# Patient Record
Sex: MALE | Race: WHITE | NOT HISPANIC OR LATINO | Employment: UNEMPLOYED | URBAN - METROPOLITAN AREA
[De-identification: names, ages, dates, MRNs, and addresses within clinical notes are randomized per-mention and may not be internally consistent; named-entity substitution may affect disease eponyms.]

---

## 2018-06-17 ENCOUNTER — HOSPITAL ENCOUNTER (EMERGENCY)
Facility: HOSPITAL | Age: 53
Discharge: HOME | End: 2018-06-17
Admitting: EMERGENCY MEDICINE
Payer: COMMERCIAL

## 2018-06-17 ENCOUNTER — APPOINTMENT (EMERGENCY)
Dept: RADIOLOGY | Facility: HOSPITAL | Age: 53
End: 2018-06-17
Payer: COMMERCIAL

## 2018-06-17 VITALS
TEMPERATURE: 98.5 F | RESPIRATION RATE: 16 BRPM | OXYGEN SATURATION: 99 % | HEART RATE: 79 BPM | DIASTOLIC BLOOD PRESSURE: 97 MMHG | SYSTOLIC BLOOD PRESSURE: 184 MMHG

## 2018-06-17 DIAGNOSIS — M79.631 RIGHT FOREARM PAIN: Primary | ICD-10-CM

## 2018-06-17 PROCEDURE — 73090 X-RAY EXAM OF FOREARM: CPT

## 2018-06-17 PROCEDURE — 99283 EMERGENCY DEPT VISIT LOW MDM: CPT

## 2018-06-17 RX ORDER — NAPROXEN 500 MG/1
500 TABLET ORAL 2 TIMES DAILY WITH MEALS
Qty: 10 TABLET | Refills: 0 | Status: SHIPPED | OUTPATIENT
Start: 2018-06-17 | End: 2018-06-22

## 2018-06-17 NOTE — ED PROVIDER NOTES
History  Chief Complaint   Patient presents with    Arm Pain     pt reports waking up 3 days ago and right arm "feels like it's broken " pt states "I feel a weird growth on the bone " reports pain in forearm  denies injury  60-year-old male history of hypertension, presents for evaluation of right forearm pain for the past 3 days  Patient reports that he has pinpoint tenderness over the ulnar aspect and states that he feels a bony growth over the bone where he has pain  Patient reports pain with rotation of the wrist   Patient states he feels as if it is broken however denies any injury or trauma to the area  States that he woke up with this pain  Reports that he has not been taking anything for pain nor applying ice  Denies paresthesias, radiation of pain into the hands  He denies fever, numbness, weakness  Pt is right hand dominant  Prior to Admission Medications   Prescriptions Last Dose Informant Patient Reported? Taking? DiphenhydrAMINE HCl (BENADRYL ALLERGY PO)   Yes No   Sig: Take by mouth as needed  cyclobenzaprine (FLEXERIL) 10 mg tablet   No No   Sig: Take 1 tablet by mouth 3 (three) times a day as needed for muscle spasms for up to 10 days   metoprolol tartrate (LOPRESSOR) 50 mg tablet   Yes No   Sig: Take 50 mg by mouth 2 (two) times a day  naproxen (NAPROSYN) 500 mg tablet   No No   Sig: Take 1 tablet by mouth 2 (two) times a day as needed for mild pain   oxymetazoline (AFRIN) 0 05 % nasal spray   Yes No   Si sprays into each nostril as needed for congestion  Facility-Administered Medications: None       Past Medical History:   Diagnosis Date    Hepatitis C     Hypertension     Psychiatric disorder        Past Surgical History:   Procedure Laterality Date    ABDOMINAL SURGERY      tumor removed from abdomen     HERNIA REPAIR         History reviewed  No pertinent family history  I have reviewed and agree with the history as documented      Social History Substance Use Topics    Smoking status: Former Smoker    Smokeless tobacco: Not on file    Alcohol use No        Review of Systems   Constitutional: Negative for chills and fever  Cardiovascular: Negative for chest pain  Gastrointestinal: Negative for vomiting  Musculoskeletal: Positive for myalgias  Negative for arthralgias and joint swelling  Skin: Negative for color change, rash and wound  Physical Exam  Physical Exam   Constitutional: He is oriented to person, place, and time  He appears well-developed and well-nourished  No distress  Musculoskeletal: He exhibits tenderness  He exhibits no edema or deformity  Right forearm: He exhibits tenderness and bony tenderness  He exhibits no swelling, no edema, no deformity and no laceration  Arms:  Pain over forearm with supination and pronation of right wrist  No swelling, deformity, rash or bony growth noted with palpation  R pulse 2 +, NVI   Neurological: He is alert and oriented to person, place, and time  Skin: Skin is warm  Capillary refill takes less than 2 seconds  No rash noted  He is not diaphoretic  No erythema  No pallor  Vitals reviewed        Vital Signs  ED Triage Vitals   Temperature Pulse Respirations Blood Pressure SpO2   06/17/18 1857 06/17/18 1857 06/17/18 1857 06/17/18 1858 06/17/18 1857   98 5 °F (36 9 °C) 79 16 (!) 192/115 99 %      Temp Source Heart Rate Source Patient Position - Orthostatic VS BP Location FiO2 (%)   06/17/18 1857 06/17/18 1857 06/17/18 1857 06/17/18 1857 --   Temporal Monitor Sitting Right arm       Pain Score       --                  Vitals:    06/17/18 1857 06/17/18 1858 06/17/18 1902   BP:  (!) 192/115 (!) 184/97   Pulse: 79     Patient Position - Orthostatic VS: Sitting         Visual Acuity      ED Medications  Medications - No data to display    Diagnostic Studies  Results Reviewed     None                 XR forearm 2 views RIGHT   ED Interpretation by Peter Longo PA-C (06/17 1942) No fracture or bony growth noted  Procedures  Procedures       Phone Contacts  ED Phone Contact    ED Course                               MDM  CritCare Time    Disposition  Final diagnoses:   Right forearm pain     Time reflects when diagnosis was documented in both MDM as applicable and the Disposition within this note     Time User Action Codes Description Comment    6/17/2018  7:45 PM Yara Reyes Add [Q88 252] Right forearm pain       ED Disposition     ED Disposition Condition Comment    Discharge  Ruslan Sunshine discharge to home/self care  Condition at discharge: Stable        Follow-up Information     Follow up With Specialties Details Why [address] [facility] Schedule an appointment as soon as possible for a visit in 2 weeks Follow up if symptoms persist   [facility] [id number] [phone]          Patient's Medications   Discharge Prescriptions    NAPROXEN (NAPROSYN) 500 MG TABLET    Take 1 tablet (500 mg total) by mouth 2 (two) times a day with meals for 5 days       Start Date: 6/17/2018 End Date: 6/22/2018       Order Dose: 500 mg       Quantity: 10 tablet    Refills: 0     No discharge procedures on file      ED Provider  Electronically Signed by           Brenna Cavanaugh PA-C  06/17/18 7199

## 2018-06-17 NOTE — DISCHARGE INSTRUCTIONS
Musculoskeletal Pain   WHAT YOU NEED TO KNOW:   Muscle pain can be dull, achy, or sharp  You may have pain and tenderness to the touch as well  It can occur anywhere on your body and is often brought on by exercise  Muscle pain may occur from an injury, such as a sprain, tendonitis, or bone fracture  Muscle pain can also be the result of medical conditions, such as polymyositis, fibromyalgia, and connective tissue disorders  DISCHARGE INSTRUCTIONS:   Self care:   · Rest  as directed and avoid activity that causes pain  You may be able to return to normal activity when you can move without pain  Follow directions for rest and activity  You are at risk for injury for 3 weeks after your symptoms go away  · Ice  your painful muscle area to decrease pain and swelling  Use an ice pack, or put ice in a plastic bag and cover it with a towel  Always  put a cloth between the ice and your skin  Apply the ice as often as directed for the first 24 to 48 hours  · Compression  with a splint, brace, or elastic bandage helps decrease pain and swelling  This may be needed for muscle pain in arms or legs  A splint, brace, or bandage will also help protect the painful area when you move around  · Elevate  a painful arm or leg to reduce swelling and pain  Elevate your limb while you are sitting or lying down  Prop a painful leg on pillows to keep it above the level of your heart  Medicines:   · NSAIDs  help decrease swelling and pain or fever  This medicine is available with or without a doctor's order  NSAIDs can cause stomach bleeding or kidney problems in certain people  If you take blood thinner medicine, always ask your healthcare provider if NSAIDs are safe for you  Always read the medicine label and follow directions  · Acetaminophen  is used to decrease pain  It is available without a doctor's order  Ask your healthcare provider how much to take and when to take it  Follow directions   Acetaminophen can cause liver damage if not taken correctly  Do not take more than one medicine that contains acetaminophen unless directed  · Muscle relaxers  help relax your muscles to decrease pain and muscle spasms  · Steroids  may be given to decrease redness, pain, and swelling  · Take your medicine as directed  Contact your healthcare provider if you think your medicine is not helping or if you have side effects  Tell him if you are allergic to any medicine  Keep a list of the medicines, vitamins, and herbs you take  Include the amounts, and when and why you take them  Bring the list or the pill bottles to follow-up visits  Carry your medicine list with you in case of an emergency  Follow up with your healthcare provider as directed: You may need more tests to help healthcare providers find the cause of your muscle pain  You may need physical therapy to learn muscle strengthening exercises  Write down your questions so you remember to ask them during your visits  Contact your healthcare provider if:   · You have a fever  · You have trouble sleeping because of your pain  · Your painful area becomes more tender, red, and warm to the touch  · You have decreased movement of the painful area  · You have questions or concerns about your condition or care  Return to the emergency department if:   · You have increased severe pain when you move the painful muscle area  · You lose feeling in your painful muscle area  · You have new or worse swelling in the painful area  Your skin may feel tight  · You have increased muscle pain or pain that does not improve with treatment  © 2017 [employer] [employer] Information is for End User's use only and may not be sold, redistributed or otherwise used for commercial purposes  All illustrations and images included in CareNotes® are the copyrighted property of [email or web address]  or Gaurav Dudley    The above information is an  only  It is not intended as medical advice for individual conditions or treatments  Talk to your doctor, nurse or pharmacist before following any medical regimen to see if it is safe and effective for you

## 2018-10-07 ENCOUNTER — HOSPITAL ENCOUNTER (EMERGENCY)
Facility: HOSPITAL | Age: 53
Discharge: HOME | End: 2018-10-07
Attending: EMERGENCY MEDICINE | Admitting: EMERGENCY MEDICINE
Payer: COMMERCIAL

## 2018-10-07 ENCOUNTER — APPOINTMENT (EMERGENCY)
Dept: RADIOLOGY | Facility: HOSPITAL | Age: 53
End: 2018-10-07
Payer: COMMERCIAL

## 2018-10-07 VITALS
WEIGHT: 180 LBS | SYSTOLIC BLOOD PRESSURE: 166 MMHG | RESPIRATION RATE: 18 BRPM | OXYGEN SATURATION: 98 % | HEART RATE: 72 BPM | DIASTOLIC BLOOD PRESSURE: 90 MMHG | TEMPERATURE: 98.6 F

## 2018-10-07 DIAGNOSIS — M53.3 COCCYX PAIN: Primary | ICD-10-CM

## 2018-10-07 PROCEDURE — 99283 EMERGENCY DEPT VISIT LOW MDM: CPT

## 2018-10-07 PROCEDURE — 96372 THER/PROPH/DIAG INJ SC/IM: CPT

## 2018-10-07 PROCEDURE — 72100 X-RAY EXAM L-S SPINE 2/3 VWS: CPT

## 2018-10-07 RX ORDER — METHOCARBAMOL 500 MG/1
500 TABLET, FILM COATED ORAL 2 TIMES DAILY
Qty: 10 TABLET | Refills: 0 | Status: SHIPPED | OUTPATIENT
Start: 2018-10-07 | End: 2018-10-12

## 2018-10-07 RX ORDER — LIDOCAINE 50 MG/G
1 PATCH TOPICAL DAILY
Qty: 30 PATCH | Refills: 0 | Status: SHIPPED | OUTPATIENT
Start: 2018-10-07

## 2018-10-07 RX ORDER — LIDOCAINE 50 MG/G
1 PATCH TOPICAL ONCE
Status: DISCONTINUED | OUTPATIENT
Start: 2018-10-07 | End: 2018-10-07 | Stop reason: HOSPADM

## 2018-10-07 RX ORDER — KETOROLAC TROMETHAMINE 30 MG/ML
15 INJECTION, SOLUTION INTRAMUSCULAR; INTRAVENOUS ONCE
Status: COMPLETED | OUTPATIENT
Start: 2018-10-07 | End: 2018-10-07

## 2018-10-07 RX ADMIN — KETOROLAC TROMETHAMINE 15 MG: 30 INJECTION, SOLUTION INTRAMUSCULAR at 20:23

## 2018-10-07 RX ADMIN — LIDOCAINE 1 PATCH: 50 PATCH TOPICAL at 20:24

## 2018-10-07 NOTE — ED PROVIDER NOTES
History  Chief Complaint   Patient presents with    Tailbone Pain     Pt c/o tailbone pain reports pain started today, denies any injury     Patient is a 48year-old male that presents to emergency department with intermittent sharp tailbone pain with radiation to left ankle for 1 week  Patient states that he has been feeling persistent dull and achy tailbone pain for 3 months  Patient is an avid walker and walks 7-8 miles per day, including today  Patient states that sometimes his tailbone pain causes his left knee to "give out and buckle underneath him"  Patient also states that his tailbone "drops down and he has to reduce it "  Patient affirms provocative factors of pressure to tailbone  Patient denies palliative factors  Patient states that Aleve is an ineffective treatment "that I can take a whole bottle and would not even touch by pain"  Patient denies fall and trauma  Patient denies saddle paresthesias and urinary symptoms  Patient denies diarrhea, and constipation  Patient denies fevers, chills, nausea, and vomiting  No chest pain, shortness of breath, or abdominal pain  Patient is not in acute distress  History provided by:  Patient   used: No        Prior to Admission Medications   Prescriptions Last Dose Informant Patient Reported? Taking? DiphenhydrAMINE HCl (BENADRYL ALLERGY PO)   Yes No   Sig: Take by mouth as needed  cyclobenzaprine (FLEXERIL) 10 mg tablet   No No   Sig: Take 1 tablet by mouth 3 (three) times a day as needed for muscle spasms for up to 10 days   metoprolol tartrate (LOPRESSOR) 50 mg tablet   Yes No   Sig: Take 50 mg by mouth 2 (two) times a day     naproxen (NAPROSYN) 500 mg tablet   No No   Sig: Take 1 tablet by mouth 2 (two) times a day as needed for mild pain   naproxen (NAPROSYN) 500 mg tablet   No No   Sig: Take 1 tablet (500 mg total) by mouth 2 (two) times a day with meals for 5 days   oxymetazoline (AFRIN) 0 05 % nasal spray   Yes No   Si sprays into each nostril as needed for congestion  Facility-Administered Medications: None       Past Medical History:   Diagnosis Date    Hepatitis C     Hypertension     Psychiatric disorder        Past Surgical History:   Procedure Laterality Date    ABDOMINAL SURGERY      tumor removed from abdomen     HERNIA REPAIR         History reviewed  No pertinent family history  I have reviewed and agree with the history as documented  Social History   Substance Use Topics    Smoking status: Former Smoker    Smokeless tobacco: Never Used    Alcohol use No        Review of Systems   Constitutional: Negative for activity change, appetite change, chills and fever  Eyes: Negative for photophobia and visual disturbance  Respiratory: Negative for cough, chest tightness and shortness of breath  Cardiovascular: Negative for chest pain and palpitations  Gastrointestinal: Negative for abdominal pain, constipation, diarrhea, nausea and vomiting  Genitourinary: Negative for difficulty urinating, dysuria and frequency  Musculoskeletal: Negative for back pain, gait problem, neck pain and neck stiffness  Tailbone pain   Skin: Negative for pallor and rash  Allergic/Immunologic: Negative for environmental allergies and food allergies  Neurological: Negative for dizziness, weakness, light-headedness, numbness and headaches  Psychiatric/Behavioral: Negative for confusion  The patient is hyperactive  All other systems reviewed and are negative  Physical Exam  Physical Exam   Constitutional: He is oriented to person, place, and time  He appears well-developed and well-nourished  He is cooperative  Non-toxic appearance  He does not have a sickly appearance  He does not appear ill  No distress  HENT:   Head: Normocephalic and atraumatic  Right Ear: Hearing and external ear normal  No drainage, swelling or tenderness  No mastoid tenderness   No decreased hearing is noted  Left Ear: Hearing and external ear normal  No drainage, swelling or tenderness  No mastoid tenderness  No decreased hearing is noted  Nose: Nose normal    Mouth/Throat: Uvula is midline, oropharynx is clear and moist and mucous membranes are normal    Eyes: Pupils are equal, round, and reactive to light  Conjunctivae, EOM and lids are normal  Right eye exhibits no discharge  Left eye exhibits no discharge  Neck: Trachea normal, normal range of motion, full passive range of motion without pain and phonation normal  Neck supple  No JVD present  No tracheal tenderness, no spinous process tenderness and no muscular tenderness present  No neck rigidity  No tracheal deviation, no edema, no erythema and normal range of motion present  Cardiovascular: Normal rate, regular rhythm, normal heart sounds, intact distal pulses and normal pulses  Pulses:       Carotid pulses are 2+ on the right side, and 2+ on the left side  Radial pulses are 2+ on the right side, and 2+ on the left side  Dorsalis pedis pulses are 2+ on the right side, and 2+ on the left side  Posterior tibial pulses are 2+ on the right side, and 2+ on the left side  Pulmonary/Chest: Effort normal and breath sounds normal  No stridor  No respiratory distress  He has no decreased breath sounds  He has no wheezes  He has no rhonchi  He has no rales  He exhibits no tenderness and no crepitus  Abdominal: Soft  Bowel sounds are normal  He exhibits no distension  There is no tenderness  There is no guarding  Musculoskeletal: Normal range of motion  Passive ROM intact  Upper and lower extremity 5/5 bilaterally  Neurovascularly intact  No grinding or clicking of joints    No tenderness with palpation of the coccyx  Gait normal   Lymphadenopathy:        Head (right side): No submental, no submandibular, no tonsillar, no preauricular, no posterior auricular and no occipital adenopathy present          Head (left side): No submental, no submandibular, no tonsillar, no preauricular, no posterior auricular and no occipital adenopathy present  He has no cervical adenopathy  Right cervical: No superficial cervical, no deep cervical and no posterior cervical adenopathy present  Left cervical: No superficial cervical, no deep cervical and no posterior cervical adenopathy present  Neurological: He is alert and oriented to person, place, and time  He has normal strength and normal reflexes  He displays no atrophy and no tremor  No cranial nerve deficit or sensory deficit  He exhibits normal muscle tone  He displays no seizure activity  Coordination and gait normal  GCS eye subscore is 4  GCS verbal subscore is 5  GCS motor subscore is 6  Reflex Scores:       Patellar reflexes are 2+ on the right side and 2+ on the left side  No neurological deficit noted on exam; neurovascularly intact   Skin: Skin is warm and intact  Capillary refill takes less than 2 seconds  He is not diaphoretic  Psychiatric: He has a normal mood and affect  His behavior is normal  Judgment and thought content normal  His speech is rapid and/or pressured  Cognition and memory are normal    Throughout the course of the physical exam, the patient had pressured speech  Vitals reviewed        Vital Signs  ED Triage Vitals [10/07/18 1923]   Temperature Pulse Respirations Blood Pressure SpO2   98 6 °F (37 °C) 77 16 158/87 98 %      Temp Source Heart Rate Source Patient Position - Orthostatic VS BP Location FiO2 (%)   Oral Monitor Sitting Right arm --      Pain Score       5           Vitals:    10/07/18 1923 10/07/18 2034   BP: 158/87 166/90   Pulse: 77 72   Patient Position - Orthostatic VS: Sitting Sitting       Visual Acuity      ED Medications  Medications   ketorolac (TORADOL) injection 15 mg (15 mg Intramuscular Given 10/7/18 2023)       Diagnostic Studies  Results Reviewed     None                 XR lumbar spine 2 or 3 views   Final Result by Rocael Chowdhury DO (10/08 2082)   Progressive degenerative changes  Workstation performed: [id number]                    Procedures  Procedures       Phone Contacts  ED Phone Contact    ED Course  ED Course as of Oct 09 1422   Leann Flaco Oct 07, 2018   2016 Patient is seen walking to the x-ray department without difficulty, and without the use of an assistive ambulatory device or cane  2020 Patient is also seen comfortably sitting on a hard chair in his patient room  MDM  Number of Diagnoses or Management Options  Coccyx pain: minor  Diagnosis management comments: Differential diagnosis covers but is not limited to:  Herniated disc  Vertebral subluxation  Caudal equina syndrome  Vertebral fracture  Back sprain    Back xray- IMPRESSION: Progressive degenerative changes  Delivered Toradol in the Emergency Department  Prescribed robaxin and Lidoderm patches  F/u with comprehensive spine  F/u with PCP  F/u with emergency department if symptoms persist or exacerbate       Amount and/or Complexity of Data Reviewed  Tests in the radiology section of CPT®: ordered and reviewed    Risk of Complications, Morbidity, and/or Mortality  Presenting problems: minimal    Patient Progress  Patient progress: stable    CritCare Time    Disposition  Final diagnoses:   Coccyx pain     Time reflects when diagnosis was documented in both MDM as applicable and the Disposition within this note     Time User Action Codes Description Comment    10/7/2018  8:21 PM Zeeshan Pérez Add [M53 3] Coccyx pain       ED Disposition     ED Disposition Condition Comment    Discharge  Oralia Olivier discharge to home/self care      Condition at discharge: Stable        Follow-up Information     Follow up With Specialties Details Why Contact Info Additional Information    [facility] Physical Therapy Call in 2 days for further evaluation of symptoms Kacie Wil[address] [place] [phone]  [facility], [place], [place], [address] [facility] Emergency Department Emergency Medicine Go to As needed [address] [facility]  [phone] [place] ED, [address]  , [place], [place], [address] Family Medicine Call in 2 days for further evaluation of symptoms [address]  [facility] [address], [address] [place], [place], [phone]          Discharge Medication List as of 10/7/2018  8:42 PM      START taking these medications    Details   lidocaine (LIDODERM) 5 % Apply 1 patch topically daily Remove & Discard patch within 12 hours or as directed by MD, Starting Sun 10/7/2018, Print      methocarbamol (ROBAXIN) 500 mg tablet Take 1 tablet (500 mg total) by mouth 2 (two) times a day for 5 days, Starting Sun 10/7/2018, Until Fri 10/12/2018, Print         CONTINUE these medications which have NOT CHANGED    Details   cyclobenzaprine (FLEXERIL) 10 mg tablet Take 1 tablet by mouth 3 (three) times a day as needed for muscle spasms for up to 10 days, Starting 11/11/2016, Until Mon 11/21/16, Print      DiphenhydrAMINE HCl (BENADRYL ALLERGY PO) Take by mouth as needed  , Until Discontinued, Historical Med      metoprolol tartrate (LOPRESSOR) 50 mg tablet Take 50 mg by mouth 2 (two) times a day , Until Discontinued, Historical Med      naproxen (NAPROSYN) 500 mg tablet Take 1 tablet by mouth 2 (two) times a day as needed for mild pain, Starting 11/11/2016, Until Discontinued, Print      oxymetazoline (AFRIN) 0 05 % nasal spray 2 sprays into each nostril as needed for congestion  , Until Discontinued, Historical Med           No discharge procedures on file      ED Provider  Electronically Signed by           Elsa Coreas PA-C  10/09/18 4509

## 2018-10-08 NOTE — DISCHARGE INSTRUCTIONS
Methocarbamol (By mouth)   Methocarbamol (meth-oh-LIBAN-ba-mol)  Treats muscle pain and spasms  Brand Name(s): Robaxin, Robaxin-750   There may be other brand names for this medicine  When This Medicine Should Not Be Used: You should not use this medicine if you have had an allergic reaction to methocarbamol or to related medicine such as Norgesic® or Equanil®  How to Use This Medicine:   Tablet  · Your doctor will tell you how much to take and how often  · You should not use a larger dose or take more often than your doctor ordered  If a dose is missed:   · Take the missed dose as soon as you remember if it is in within one hour of your dose time  If it is more than one hour later, skip the missed dose and return to your regular schedule  · You should not use two doses at the same time  How to Store and Dispose of This Medicine:   · Store methocarbamol at room temperature away from excess heat, moisture, and light  · Keep all medicine out of the reach of children  Drugs and Foods to Avoid:   Ask your doctor or pharmacist before using any other medicine, including over-the-counter medicines, vitamins, and herbal products  · Do not drink alcohol while taking this medicine  · Make sure your doctor knows if you are taking any medicine that can make you sleepy such as sleeping pills, sedatives, antidepressants, cold and allergy medicines, or pain  Warnings While Using This Medicine:   · If you are pregnant or breastfeeding, talk to your doctor before taking this medicine  · Methocarbamol makes some people dizzy or drowsy  Be careful if driving a car or using machinery  · This medicine can make your urine turn brown, black, or green  This is not a cause for concern    Possible Side Effects While Using This Medicine:   Call your doctor right away if you notice any of these side effects:  · Trouble breathing  · Skin rash, hives, or itching  · Slurred speech or severe drowsiness  · Fever  If you notice these less serious side effects, talk with your doctor:   · Headache  · Drowsiness or dizziness  · Nausea  · Loss of appetite or metallic taste  · Nasal congestion  If you notice other side effects that you think are caused by this medicine, tell your doctor  Call your doctor for medical advice about side effects  You may report side effects to FDA at 1-61965  © 2017 [address] Information is for End User's use only and may not be sold, redistributed or otherwise used for commercial purposes  The above information is an  only  It is not intended as medical advice for individual conditions or treatments  Talk to your doctor, nurse or pharmacist before following any medical regimen to see if it is safe and effective for you  Lidocaine Patch (On the skin)   Lidocaine (QTP-cuv-yuvy)  Treats nerve pain that is caused by herpes zoster or shingles  Brand Name(s): DermacinRx PHN [place], DermacinRx ZRM [place], Dermazyl, Lidocaine Novaplus, Lidocare, [place], Xryliderm   There may be other brand names for this medicine  When This Medicine Should Not Be Used: This medicine is not right for everyone  Do not use it if you had an allergic reaction to lidocaine or similar medicines  How to Use This Medicine:   Patch  · Your doctor will tell you how many patches to use, where to apply them, and how often to apply them  Do not use more patches or apply them more often than your doctor tells you to  · This medicine should be used only on the skin  Do not get it in your eyes, nose, or mouth  If it does get on these areas, rinse it off with water or saline right away  · Keep the patch in its envelope until you are ready to put it on  You may cut the patch into a smaller size with scissors before you take off the patch liner  · Wash your hands with soap and water before and after applying a patch  · Apply the patch to clean, dry skin   Do not put the patch over burns, cuts, or irritated skin  · The patch will not stick if it gets wet  Do not wear it when you take a bath or shower, or when you go swimming  · Do not wear the patch for longer than 12 hours in any 24-hour period  · Store the patches at room temperature in a closed container, away from heat, moisture, and direct light  · Fold the used patch in half with the sticky sides together  Throw any used patch away so that children or pets cannot get to it  You will also need to throw away old patches after the expiration date has passed  Drugs and Foods to Avoid:   Ask your doctor or pharmacist before using any other medicine, including over-the-counter medicines, vitamins, and herbal products  · Some foods and medicines can affect how lidocaine works  Tell your doctor if you are using the followin [facility] for heart rhythm problems, such as mexiletine  ¨ Other topical medicine  Warnings While Using This Medicine:   · Tell your doctor if you are pregnant or breastfeeding, or if you have liver disease  Tell your doctor if you are allergic to any other medicine  · Do not use a heating pad, electric blanket, or other heat source over the patch     · Keep all medicine out of the reach of children  Never share your medicine with anyone  Possible Side Effects While Using This Medicine:   Call your doctor right away if you notice any of these side effects:  · Allergic reaction: Itching or hives, swelling in your face or hands, swelling or tingling in your mouth or throat, chest tightness, trouble breathing  · Redness, itching, burning, swelling, or blisters where the patch is applied  If you notice other side effects that you think are caused by this medicine, tell your doctor  Call your doctor for medical advice about side effects   You may report side effects to FDA at 9-377-FDA-3571  © 2017 Carlos Hernandez Information is for End User's use only and may not be sold, redistributed or otherwise used for commercial purposes  The above information is an  only  It is not intended as medical advice for individual conditions or treatments  Talk to your doctor, nurse or pharmacist before following any medical regimen to see if it is safe and effective for you  Coccyx Injury   WHAT YOU NEED TO KNOW:   A coccyx (tailbone) injury is when your coccyx breaks, dislocates, or is not stable  The coccyx is a small bone shaped like a triangle that forms the bottom of your spine  DISCHARGE INSTRUCTIONS:   Medicines: You may need any of the following:  · NSAIDs  decrease swelling and pain or fever  This medicine can be bought with or without a doctor's order  This medicine can cause stomach bleeding or kidney problems in certain people  If you take blood thinner medicine, always ask your healthcare provider if NSAIDs are safe for you  Always read the medicine label and follow the directions on it before using this medicine  · Prescription pain medicine  may be given to decrease pain  Do not wait until the pain is severe before you take this medicine  · A bowel movement softener  makes it easier and less painful for you to have a bowel movement  · Take your medicine as directed  Contact your healthcare provider if you think your medicine is not helping or if you have side effects  Tell him if you are allergic to any medicine  Keep a list of the medicines, vitamins, and herbs you take  Include the amounts, and when and why you take them  Bring the list or the pill bottles to follow-up visits  Carry your medicine list with you in case of an emergency  Self-care:   · Use a donut-shaped cushion  to decrease pain and support your coccyx when you sit  · Ice  helps decrease swelling and pain  Ice may also help prevent tissue damage  Use an ice pack, or put crushed ice in a plastic bag  Cover it with a towel and place it on your coccyx for 15 to 20 minutes every hour or as directed  · Sleep  on a firm mattress  Place a pillow under your knees if you sleep on your back  Or, sleep on your side with a pillow between your knees  This will decrease pain and tension in your coccyx and back  Follow up with your healthcare provider as directed:  Write down your questions so you remember to ask them during your visits  Contact your healthcare provider if:   · You have trouble urinating or having a bowel movement  · Your pain or swelling get worse or do not go away with treatment  · You have a fever  · You have questions or concerns about your condition or care  Return to the emergency department if:   · You have trouble breathing  · You cannot move your legs  · Your legs suddenly go numb  · You have severe pain  © 2017 [address]  Information is for End User's use only and may not be sold, redistributed or otherwise used for commercial purposes  All illustrations and images included in CareNotes® are the copyrighted property of A D A M [employer] Inc  or Gaurav Dudley  The above information is an  only  It is not intended as medical advice for individual conditions or treatments  Talk to your doctor, nurse or pharmacist before following any medical regimen to see if it is safe and effective for you

## 2019-07-28 ENCOUNTER — HOSPITAL ENCOUNTER (EMERGENCY)
Facility: HOSPITAL | Age: 54
Discharge: HOME | End: 2019-07-28
Attending: EMERGENCY MEDICINE | Admitting: EMERGENCY MEDICINE
Payer: COMMERCIAL

## 2019-07-28 ENCOUNTER — APPOINTMENT (EMERGENCY)
Dept: CT IMAGING | Facility: HOSPITAL | Age: 54
End: 2019-07-28
Payer: COMMERCIAL

## 2019-07-28 VITALS
SYSTOLIC BLOOD PRESSURE: 134 MMHG | TEMPERATURE: 97.9 F | OXYGEN SATURATION: 97 % | RESPIRATION RATE: 18 BRPM | WEIGHT: 189.38 LBS | DIASTOLIC BLOOD PRESSURE: 82 MMHG | HEART RATE: 77 BPM

## 2019-07-28 DIAGNOSIS — R10.32 LEFT GROIN PAIN: Primary | ICD-10-CM

## 2019-07-28 LAB
ALBUMIN SERPL BCP-MCNC: 4.3 G/DL (ref 3.5–5)
ALP SERPL-CCNC: 108 U/L (ref 46–116)
ALT SERPL W P-5'-P-CCNC: 26 U/L (ref 12–78)
ANION GAP SERPL CALCULATED.3IONS-SCNC: 10 MMOL/L (ref 4–13)
AST SERPL W P-5'-P-CCNC: 25 U/L (ref 5–45)
BASOPHILS # BLD AUTO: 0.08 THOUSANDS/ΜL (ref 0–0.1)
BASOPHILS NFR BLD AUTO: 1 % (ref 0–1)
BILIRUB SERPL-MCNC: 0.22 MG/DL (ref 0.2–1)
BUN SERPL-MCNC: 19 MG/DL (ref 5–25)
CALCIUM SERPL-MCNC: 9.1 MG/DL (ref 8.3–10.1)
CHLORIDE SERPL-SCNC: 105 MMOL/L (ref 100–108)
CO2 SERPL-SCNC: 26 MMOL/L (ref 21–32)
CREAT SERPL-MCNC: 0.9 MG/DL (ref 0.6–1.3)
EOSINOPHIL # BLD AUTO: 0.34 THOUSAND/ΜL (ref 0–0.61)
EOSINOPHIL NFR BLD AUTO: 4 % (ref 0–6)
ERYTHROCYTE [DISTWIDTH] IN BLOOD BY AUTOMATED COUNT: 13.4 % (ref 11.6–15.1)
GFR SERPL CREATININE-BSD FRML MDRD: 97 ML/MIN/1.73SQ M
GLUCOSE SERPL-MCNC: 97 MG/DL (ref 65–140)
HCT VFR BLD AUTO: 40.5 % (ref 36.5–49.3)
HGB BLD-MCNC: 13.3 G/DL (ref 12–17)
IMM GRANULOCYTES # BLD AUTO: 0.05 THOUSAND/UL (ref 0–0.2)
IMM GRANULOCYTES NFR BLD AUTO: 1 % (ref 0–2)
LYMPHOCYTES # BLD AUTO: 2.96 THOUSANDS/ΜL (ref 0.6–4.47)
LYMPHOCYTES NFR BLD AUTO: 31 % (ref 14–44)
MCH RBC QN AUTO: 29.6 PG (ref 26.8–34.3)
MCHC RBC AUTO-ENTMCNC: 32.8 G/DL (ref 31.4–37.4)
MCV RBC AUTO: 90 FL (ref 82–98)
MONOCYTES # BLD AUTO: 0.82 THOUSAND/ΜL (ref 0.17–1.22)
MONOCYTES NFR BLD AUTO: 9 % (ref 4–12)
NEUTROPHILS # BLD AUTO: 5.35 THOUSANDS/ΜL (ref 1.85–7.62)
NEUTS SEG NFR BLD AUTO: 54 % (ref 43–75)
NRBC BLD AUTO-RTO: 0 /100 WBCS
PLATELET # BLD AUTO: 280 THOUSANDS/UL (ref 149–390)
PMV BLD AUTO: 9.9 FL (ref 8.9–12.7)
POTASSIUM SERPL-SCNC: 4.5 MMOL/L (ref 3.5–5.3)
PROT SERPL-MCNC: 7.9 G/DL (ref 6.4–8.2)
RBC # BLD AUTO: 4.49 MILLION/UL (ref 3.88–5.62)
SODIUM SERPL-SCNC: 141 MMOL/L (ref 136–145)
WBC # BLD AUTO: 9.6 THOUSAND/UL (ref 4.31–10.16)

## 2019-07-28 PROCEDURE — 85025 COMPLETE CBC W/AUTO DIFF WBC: CPT | Performed by: EMERGENCY MEDICINE

## 2019-07-28 PROCEDURE — 74177 CT ABD & PELVIS W/CONTRAST: CPT

## 2019-07-28 PROCEDURE — 99284 EMERGENCY DEPT VISIT MOD MDM: CPT

## 2019-07-28 PROCEDURE — 80053 COMPREHEN METABOLIC PANEL: CPT | Performed by: EMERGENCY MEDICINE

## 2019-07-28 PROCEDURE — 96361 HYDRATE IV INFUSION ADD-ON: CPT

## 2019-07-28 PROCEDURE — 96375 TX/PRO/DX INJ NEW DRUG ADDON: CPT

## 2019-07-28 PROCEDURE — 96374 THER/PROPH/DIAG INJ IV PUSH: CPT

## 2019-07-28 PROCEDURE — 99284 EMERGENCY DEPT VISIT MOD MDM: CPT | Performed by: EMERGENCY MEDICINE

## 2019-07-28 PROCEDURE — 96376 TX/PRO/DX INJ SAME DRUG ADON: CPT

## 2019-07-28 PROCEDURE — 36415 COLL VENOUS BLD VENIPUNCTURE: CPT | Performed by: EMERGENCY MEDICINE

## 2019-07-28 RX ORDER — OXYCODONE HYDROCHLORIDE AND ACETAMINOPHEN 5; 325 MG/1; MG/1
1 TABLET ORAL EVERY 4 HOURS PRN
Qty: 12 TABLET | Refills: 0 | Status: SHIPPED | OUTPATIENT
Start: 2019-07-28 | End: 2019-08-07

## 2019-07-28 RX ORDER — MORPHINE SULFATE 4 MG/ML
4 INJECTION, SOLUTION INTRAMUSCULAR; INTRAVENOUS ONCE
Status: COMPLETED | OUTPATIENT
Start: 2019-07-28 | End: 2019-07-28

## 2019-07-28 RX ORDER — MORPHINE SULFATE 4 MG/ML
6 INJECTION, SOLUTION INTRAMUSCULAR; INTRAVENOUS ONCE
Status: COMPLETED | OUTPATIENT
Start: 2019-07-28 | End: 2019-07-28

## 2019-07-28 RX ORDER — NAPROXEN 500 MG/1
500 TABLET ORAL 2 TIMES DAILY WITH MEALS
Qty: 30 TABLET | Refills: 0 | Status: SHIPPED | OUTPATIENT
Start: 2019-07-28

## 2019-07-28 RX ORDER — KETOROLAC TROMETHAMINE 30 MG/ML
15 INJECTION, SOLUTION INTRAMUSCULAR; INTRAVENOUS ONCE
Status: COMPLETED | OUTPATIENT
Start: 2019-07-28 | End: 2019-07-28

## 2019-07-28 RX ADMIN — KETOROLAC TROMETHAMINE 15 MG: 30 INJECTION, SOLUTION INTRAMUSCULAR at 20:11

## 2019-07-28 RX ADMIN — MORPHINE SULFATE 6 MG: 4 INJECTION INTRAVENOUS at 22:06

## 2019-07-28 RX ADMIN — SODIUM CHLORIDE 1000 ML: 0.9 INJECTION, SOLUTION INTRAVENOUS at 20:11

## 2019-07-28 RX ADMIN — MORPHINE SULFATE 4 MG: 4 INJECTION INTRAVENOUS at 20:12

## 2019-07-28 RX ADMIN — KETOROLAC TROMETHAMINE 15 MG: 30 INJECTION, SOLUTION INTRAMUSCULAR at 22:05

## 2019-07-28 RX ADMIN — IOHEXOL 100 ML: 350 INJECTION, SOLUTION INTRAVENOUS at 21:44

## 2019-07-28 NOTE — ED NOTES
Patient c/o pain in his groin and asking when he may be able to get some pain medication  RN explained to patient that we need to wait for a provider to see him before any medications are ordered       Aidan Singh RN  07/28/19 1940

## 2019-07-28 NOTE — ED NOTES
Patient states that he had 2 arterial catheters placed on March 5th on the right side and on April 5th catheterization was on the left side  2 weeks after the April 5th procedure on the left the pain and bruising turned into numbness in upper left extremity and groin area (inclusing testicles and penis ) Patient having sharp pains at this time in his groin that are excruciating       Leni Alberts RN  07/28/19 [address] [place] [address] Drive Shabana Sauer RN  07/28/19 7151

## 2019-07-29 NOTE — ED PROVIDER NOTES
History  Chief Complaint   Patient presents with    Groin Pain     left sided hernia per pt and excessive pain c/o burning pain down his leg  Pt is a 48year old male with a PMH of hepatitis C and hypertension presenting with LLQ pain x 2 weeks  Pt states he was lifting heavy materials at work when he felt a sudden sharp pain in his LLQ and groin area  States the pain has gotten gradually worse and is now 10/10 sharp pain  Associated nausea  Also complaining of left groin pain that has been ongoing since 19  States he had cardiac catheterization through left femoral artery  He developed a large hematoma from the site to his left knee with severe pain  States at one point his left leg became completely numb, including the base of his penis and left testicle  States the numbness has subsided for the most part, but the pain in his left groin has remained  States it is a burning pain that is constant  Pt has had left testicular pain that has been constant for months, no change  No erythema or swelling of the testicle  No fevers or urinary symptoms  Prior to Admission Medications   Prescriptions Last Dose Informant Patient Reported? Taking? DiphenhydrAMINE HCl (BENADRYL ALLERGY PO)   Yes No   Sig: Take by mouth as needed  cyclobenzaprine (FLEXERIL) 10 mg tablet   No No   Sig: Take 1 tablet by mouth 3 (three) times a day as needed for muscle spasms for up to 10 days   lidocaine (LIDODERM) 5 %   No No   Sig: Apply 1 patch topically daily Remove & Discard patch within 12 hours or as directed by MD   methocarbamol (ROBAXIN) 500 mg tablet   No No   Sig: Take 1 tablet (500 mg total) by mouth 2 (two) times a day for 5 days   metoprolol tartrate (LOPRESSOR) 50 mg tablet   Yes No   Sig: Take 50 mg by mouth 2 (two) times a day     naproxen (NAPROSYN) 500 mg tablet   No No   Sig: Take 1 tablet by mouth 2 (two) times a day as needed for mild pain   oxymetazoline (AFRIN) 0 05 % nasal spray   Yes No   Si sprays into each nostril as needed for congestion  Facility-Administered Medications: None       Past Medical History:   Diagnosis Date    Hepatitis C     Hypertension     Psychiatric disorder        Past Surgical History:   Procedure Laterality Date    ABDOMINAL SURGERY      tumor removed from abdomen     CARDIAC CATHETERIZATION      HERNIA REPAIR         No family history on file  I have reviewed and agree with the history as documented  Social History     Tobacco Use    Smoking status: Former Smoker    Smokeless tobacco: Never Used   Substance Use Topics    Alcohol use: No    Drug use: No        Review of Systems   Constitutional: Negative for chills, diaphoresis and fever  Respiratory: Negative for shortness of breath  Cardiovascular: Negative for chest pain  Gastrointestinal: Positive for abdominal pain and nausea  Negative for abdominal distention, blood in stool, constipation, diarrhea and vomiting  Genitourinary: Positive for testicular pain  Negative for decreased urine volume, difficulty urinating, discharge, dysuria, flank pain, frequency, hematuria, scrotal swelling and urgency  Musculoskeletal: Negative for back pain  Neurological: Negative for dizziness, light-headedness and headaches  Physical Exam  Physical Exam   Constitutional: He is oriented to person, place, and time  He appears well-developed and well-nourished  He appears distressed  HENT:   Head: Normocephalic and atraumatic  Eyes: Conjunctivae and EOM are normal    Neck: Normal range of motion  Neck supple  Cardiovascular: Normal rate, regular rhythm, normal heart sounds and intact distal pulses  Pulmonary/Chest: Effort normal and breath sounds normal    Abdominal: Soft  Bowel sounds are normal  He exhibits no distension  There is tenderness in the left lower quadrant  There is no rigidity, no rebound and no guarding  Genitourinary: Left testis shows no swelling and no tenderness   Left testis is descended  Cremasteric reflex is not absent on the left side  Musculoskeletal: Normal range of motion  Neurological: He is alert and oriented to person, place, and time  Skin: Skin is warm and dry  Capillary refill takes less than 2 seconds  He is not diaphoretic         Vital Signs  ED Triage Vitals   Temperature Pulse Respirations Blood Pressure SpO2   07/28/19 1810 07/28/19 1810 07/28/19 1810 07/28/19 1811 07/28/19 1810   97 9 °F (36 6 °C) 86 18 152/80 98 %      Temp Source Heart Rate Source Patient Position - Orthostatic VS BP Location FiO2 (%)   07/28/19 1810 07/28/19 1810 07/28/19 1810 07/28/19 1810 --   Tympanic Monitor Sitting Left arm       Pain Score       07/28/19 2010       Worst Possible Pain           Vitals:    07/28/19 1810 07/28/19 1811 07/28/19 2010 07/28/19 2214   BP:  152/80 151/94 134/82   Pulse: 86  74 77   Patient Position - Orthostatic VS: Sitting  Lying Lying         Visual Acuity      ED Medications  Medications   sodium chloride 0 9 % bolus 1,000 mL (0 mL Intravenous Stopped 7/28/19 2132)   ketorolac (TORADOL) injection 15 mg (15 mg Intravenous Given 7/28/19 2011)   morphine (PF) 4 mg/mL injection 4 mg (4 mg Intravenous Given 7/28/19 2012)   ketorolac (TORADOL) injection 15 mg (15 mg Intravenous Given 7/28/19 2205)   morphine (PF) 4 mg/mL injection 6 mg (6 mg Intravenous Given 7/28/19 2206)   iohexol (OMNIPAQUE) 350 MG/ML injection (MULTI-DOSE) 100 mL (100 mL Intravenous Given 7/28/19 2144)       Diagnostic Studies  Results Reviewed     Procedure Component Value Units Date/Time    Comprehensive metabolic panel [[id number]] Collected:  07/28/19 2009    Lab Status:  Final result Specimen:  Blood from Arm, Right Updated:  07/28/19 2053     Sodium 141 mmol/L      Potassium 4 5 mmol/L      Chloride 105 mmol/L      CO2 26 mmol/L      ANION GAP 10 mmol/L      BUN 19 mg/dL      Creatinine 0 90 mg/dL      Glucose 97 mg/dL      Calcium 9 1 mg/dL      AST 25 U/L      ALT 26 U/L Alkaline Phosphatase 108 U/L      Total Protein 7 9 g/dL      Albumin 4 3 g/dL      Total Bilirubin 0 22 mg/dL      eGFR 97 ml/min/1 73sq m     Narrative:       National Kidney Disease Foundation guidelines for Chronic Kidney Disease (CKD):     Stage 1 with normal or high GFR (GFR > 90 mL/min/1 73 square meters)    Stage 2 Mild CKD (GFR = 60-89 mL/min/1 73 square meters)    Stage 3A Moderate CKD (GFR = 45-59 mL/min/1 73 square meters)    Stage 3B Moderate CKD (GFR = 30-44 mL/min/1 73 square meters)    Stage 4 Severe CKD (GFR = 15-29 mL/min/1 73 square meters)    Stage 5 End Stage CKD (GFR <15 mL/min/1 73 square meters)  Note: GFR calculation is accurate only with a steady state creatinine    CBC and differential [[id number]] Collected:  07/28/19 2009    Lab Status:  Final result Specimen:  Blood from Arm, Right Updated:  07/28/19 2022     WBC 9 60 Thousand/uL      RBC 4 49 Million/uL      Hemoglobin 13 3 g/dL      Hematocrit 40 5 %      MCV 90 fL      MCH 29 6 pg      MCHC 32 8 g/dL      RDW 13 4 %      MPV 9 9 fL      Platelets 792 Thousands/uL      nRBC 0 /100 WBCs      Neutrophils Relative 54 %      Immat GRANS % 1 %      Lymphocytes Relative 31 %      Monocytes Relative 9 %      Eosinophils Relative 4 %      Basophils Relative 1 %      Neutrophils Absolute 5 35 Thousands/µL      Immature Grans Absolute 0 05 Thousand/uL      Lymphocytes Absolute 2 96 Thousands/µL      Monocytes Absolute 0 82 Thousand/µL      Eosinophils Absolute 0 34 Thousand/µL      Basophils Absolute 0 08 Thousands/µL                  CT abdomen pelvis with contrast   Final Result by Chin Ramos MD (07/28 2237)      No acute pathology visualized on CT of the abdomen and pelvis with IV without oral contrast             Workstation performed: [id number]                    Procedures  Procedures       ED Course  ED Course as of Jul 28 2339   [facility] Jul 28, 2019 2131 Will CT abdomen and pelvis to rule out hernia complication or other abdominal process like diverticulitis, colitis although less likely  Blood work and pain control as well  Based on exam and ongoing testicular pain, that is radiating pain from the groin, I will defer ultrasound  I do not feel this a torsion or epididymitis  9462 CT of the abdomen and pelvis negative  Blood work unremarkable  Patient had negative workup with negative testicular US 7/18/19 at   94 25 77 Patient more comfortable in the room, agreeable to discharge with follow up  Educated on return precautions for symptoms  MDM    Disposition  Final diagnoses:   Left groin pain     Time reflects when diagnosis was documented in both MDM as applicable and the Disposition within this note     Time User Action Codes Description Comment    7/28/2019 11:28 PM Zach Us Add [R10 32] Left groin pain       ED Disposition     ED Disposition Condition Date/Time Comment    Discharge Good Sun Jul 28, 2019 11:28 PM Praveen Galindo discharge to home/self care              Follow-up Information     Follow up With Specialties Details Why Contact Info Additional [place] Schedule an appointment as soon as possible for a visit  As needed [address] [place] [id number]  [address], [place], [facility], [place], [phone]          Patient's Medications   Discharge Prescriptions    NAPROXEN (NAPROSYN) 500 MG TABLET    Take 1 tablet (500 mg total) by mouth 2 (two) times a day with meals       Start Date: 7/28/2019 End Date: --       Order Dose: 500 mg       Quantity: 30 tablet    Refills: 0    OXYCODONE-ACETAMINOPHEN (PERCOCET) 5-325 MG PER TABLET    Take 1 tablet by mouth every 4 (four) hours as needed for moderate pain for up to 10 daysMax Daily Amount: 6 tablets       Start Date: 7/28/2019 End Date: 8/7/2019       Order Dose: 1 tablet       Quantity: 12 tablet    Refills: 0     No discharge procedures on file      ED Provider  Electronically Signed by           Marion Avery PA-C  07/28/19 7738

## 2019-07-29 NOTE — DISCHARGE INSTRUCTIONS
Groin Pain   WHAT YOU NEED TO KNOW:   Groin pain may be felt only in your groin, or it may spread to your buttocks, thigh, or knee  An injury to your hip joint, pelvic area, lower back, or thighs can cause groin pain  DISCHARGE INSTRUCTIONS:   Medicines: You may need any of the following:  · NSAIDs , such as ibuprofen, help decrease swelling, pain, and fever  This medicine is available with or without a doctor's order  NSAIDs can cause stomach bleeding or kidney problems in certain people  If you take blood thinner medicine, always ask if NSAIDs are safe for you  Always read the medicine label and follow directions  Do not give these medicines to children under 10months of age without direction from your child's healthcare provider  · Acetaminophen  decreases pain  It is available without a doctor's order  Ask how much to take and how often to take it  Follow directions  Acetaminophen can cause liver damage if not taken correctly  · Take your medicine as directed  Contact your healthcare provider if you think your medicine is not helping or if you have side effects  Tell him of her if you are allergic to any medicine  Keep a list of the medicines, vitamins, and herbs you take  Include the amounts, and when and why you take them  Bring the list or the pill bottles to follow-up visits  Carry your medicine list with you in case of an emergency  Follow up with your healthcare provider as directed: You may need to return for more tests  Write down your questions so you remember to ask them during your visits  Self-care:   · Rest  as much as possible  Avoid activities that cause or increase your pain  · Apply ice  on your groin for 15 to 20 minutes every hour or as directed  Use an ice pack, or put crushed ice in a plastic bag  Cover it with a towel  Ice helps prevent tissue damage and decreases swelling and pain       · Apply heat  on your groin for 20 to 30 minutes every 2 hours for as many days as directed  Heat helps decrease pain and muscle spasms  Contact your healthcare provider if:   · You have a fever  · You have questions or concerns about your condition or care  Return to the emergency department if:   · You have severe pain even after you take medicine  · You have pain or burning when you urinate  · You have pain on your side that spreads to your groin  © 2017 [address] Hernandez Information is for End User's use only and may not be sold, redistributed or otherwise used for commercial purposes  All illustrations and images included in CareNotes® are the copyrighted property of [email or web address]  or Gaurav Dudley  The above information is an  only  It is not intended as medical advice for individual conditions or treatments  Talk to your doctor, nurse or pharmacist before following any medical regimen to see if it is safe and effective for you

## 2019-07-29 NOTE — ED NOTES
Pt rang to inform RN he is still in pain, provider made aware        Edwin Hidalgo RN  07/28/19 2127

## 2019-08-02 ENCOUNTER — TELEPHONE (OUTPATIENT)
Dept: SURGERY | Facility: CLINIC | Age: 54
End: 2019-08-02

## 2019-08-05 ENCOUNTER — CONSULT (OUTPATIENT)
Dept: SURGERY | Facility: CLINIC | Age: 54
End: 2019-08-05
Payer: COMMERCIAL

## 2019-08-05 VITALS
HEART RATE: 75 BPM | TEMPERATURE: 97.6 F | WEIGHT: 177.6 LBS | SYSTOLIC BLOOD PRESSURE: 132 MMHG | DIASTOLIC BLOOD PRESSURE: 84 MMHG

## 2019-08-05 DIAGNOSIS — R10.32 LEFT GROIN PAIN: Primary | ICD-10-CM

## 2019-08-05 PROCEDURE — 99243 OFF/OP CNSLTJ NEW/EST LOW 30: CPT | Performed by: SURGERY

## 2019-08-05 NOTE — PROGRESS NOTES
Assessment/Plan:   Jesse Arce is a 48 y o male who is here for:left groin pain    Patient with left groin pain and concerned for hernia  Patient had a right inguinal hernia repair with mesh in the past and states pain feels similar  Patient had a cardiac catheterization in April 2019 at [facility] in which he had a large hematoma into groin and leg with numbness and tingling into scrotum , penis and groin associated with burning sensation at times  Plan:  Pain management evaluation for possible nerve entrapment and nerve block  Can consider left groin exploration if pain persists      CC:  Chief Complaint   Patient presents with    Abdominal Pain     Patient states near his groin it feels like broke glass and something pulling inside   Inguinal Hernia     HPI:  Jesse Arce is a 48 y o male who was referred for evaluation of Abdominal Pain (Patient states near his groin it feels like broke glass and something pulling inside   ) and Inguinal Hernia    Currently complaining of initial     groin pain worse with bending, coughing,   no nausea and no vomiting,     Duration of pain: Intermittent  and over 3 months    regular bowel movement  Prior abdominal surgery: Yes:  Right inguinal hernia and exploratory laparotomy for intraabdominal mass    ROS:  General ROS: negative  negative for - chills, fatigue, fever or night sweats, weight loss  Respiratory ROS: no cough, shortness of breath, or wheezing  Cardiovascular ROS: no chest pain or dyspnea on exertion  Genito-Urinary ROS: no dysuria, trouble voiding, or hematuria  Musculoskeletal ROS: negative for - gait disturbance, joint pain or muscle pain  Neurological ROS: no TIA or stroke symptoms  GI ROS: see HPI  Skin ROS: no new rashes or lesions   Lymphatic ROS: no new adenopathy noted by pt     GYN ROS: see HPI, no new GYN history or bleeding noted  Psy ROS: no new mental or behavioral disturbances         There is no problem list on file for this patient  Allergies: Avelox [moxifloxacin hcl in nacl]; Blank placebo [placebo]; Interferons; Levofloxacin; Quinolones; Ribavirin; and Tramadol      Current Outpatient Medications:     DiphenhydrAMINE HCl (BENADRYL ALLERGY PO), Take by mouth as needed  , Disp: , Rfl:     metoprolol tartrate (LOPRESSOR) 50 mg tablet, Take 50 mg by mouth 2 (two) times a day , Disp: , Rfl:     naproxen (NAPROSYN) 500 mg tablet, Take 1 tablet by mouth 2 (two) times a day as needed for mild pain, Disp: 15 tablet, Rfl: 0    naproxen (NAPROSYN) 500 mg tablet, Take 1 tablet (500 mg total) by mouth 2 (two) times a day with meals, Disp: 30 tablet, Rfl: 0    oxyCODONE-acetaminophen (PERCOCET) 5-325 mg per tablet, Take 1 tablet by mouth every 4 (four) hours as needed for moderate pain for up to 10 daysMax Daily Amount: 6 tablets, Disp: 12 tablet, Rfl: 0    cyclobenzaprine (FLEXERIL) 10 mg tablet, Take 1 tablet by mouth 3 (three) times a day as needed for muscle spasms for up to 10 days, Disp: 20 tablet, Rfl: 0    lidocaine (LIDODERM) 5 %, Apply 1 patch topically daily Remove & Discard patch within 12 hours or as directed by MD (Patient not taking: Reported on 8/5/2019), Disp: 30 patch, Rfl: 0    methocarbamol (ROBAXIN) 500 mg tablet, Take 1 tablet (500 mg total) by mouth 2 (two) times a day for 5 days, Disp: 10 tablet, Rfl: 0    oxymetazoline (AFRIN) 0 05 % nasal spray, 2 sprays into each nostril as needed for congestion  , Disp: , Rfl:     Past Medical History:   Diagnosis Date    Hepatitis C     Hypertension     Psychiatric disorder        Past Surgical History:   Procedure Laterality Date    ABDOMINAL SURGERY      tumor removed from abdomen     CARDIAC CATHETERIZATION      HERNIA REPAIR         History reviewed  No pertinent family history  reports that he has quit smoking  He has never used smokeless tobacco  He reports that he drinks alcohol  He reports that he does not use drugs      Vitals:    08/05/19 1432   BP: 132/84   Pulse: 75   Temp: 97 6 °F (36 4 °C)        PHYSICAL EXAM  General Appearance:    Alert, cooperative, no distress, healthy   Head:    Normocephalic without obvious abnormality   Eyes:    PERRL, conjunctiva/corneas clear, EOM's intact        Neck:   Supple, no adenopathy, no JVD   Back:     Symmetric, no spinal or CVA tenderness   Lungs:     Clear to auscultation bilaterally, no wheezing or rhonchi   Heart:    Regular abdomen is soft without significant tenderness, masses, organomegaly or guarding rate and rhythm, S1 and S2 normal, no murmur   Abdomen: Bowel sounds are normal     possible small left inguinal hernia , tender to palpitation along inguinal canal   Extremities:   Extremities normal  No clubbing, cyanosis or edema   Psych:   Normal Affect, AOx3  Neurologic:  Skin:   CNII-XII intact  Strength symmetric, speech intact    Warm, dry, intact, no visible rashes or lesions             Informed consent for procedure was personally discussed, reviewed, and signed by Dr Onofre Norton  Discussion by Dr Onofre Norton was carried out regarding risks, benefits, and alternatives with the patient  Risks include but are not limited to:  bleeding, infection, and delayed wound healing or an open wound, pulmonary embolus, leaks from bowel or bile ducts or other viscus, transfusions, death  Discussed in further detail the more common complications and their rates of occurrence   was used if necessary  Patient expressed understanding of the issues discussed and wished/consented to proceed  All questions were answered by Dr Onofre Norton  Discussion performed between patient and the provider signing below  Signature:   Vivienne Ojeda MD    Date: 8/5/2019 Time: 2:59 PM     Some portions of this record may have been generated with voice recognition software  There may be translation, syntax,  or grammatical errors   Occasional wrong word or "sound-a-like" substitutions may have occurred due to the inherent limitations of the voice recognition software  Read the chart carefully and recognize, using context, where substitutions may have occurred  If you have any questions, please contact the dictating provider for clarification or correction, as needed  This encounter has been coded by a non-certified coder